# Patient Record
Sex: FEMALE | Race: WHITE
[De-identification: names, ages, dates, MRNs, and addresses within clinical notes are randomized per-mention and may not be internally consistent; named-entity substitution may affect disease eponyms.]

---

## 2017-12-22 NOTE — OR
DATE OF OPERATION:  12/22/2017

 

PREOPERATIVE DIAGNOSIS:  FOLLOW UP POLYPS.

 

POSTOPERATIVE DIAGNOSIS:  FOLLOW UP POLYPS.

 

SURGEON:  Piotr Hutchinson MD

 

PROCEDURE:  FULL-LENGTH COLONOSCOPY.

 

ANESTHESIA:  CRNA.

 

COMPLICATIONS:  None.

 

SPECIMEN:  None.

 

FINDINGS:  Normal full-length colonoscopy.

 

RECOMMENDATIONS:  Followup colonoscopy in 5 years.

 

INDICATIONS:  The patient is in for a followup.  She had four polyps removed two

years ago.  She is due for followup scope.

 

DESCRIPTION OF PROCEDURE:  The patient was prepped and draped, placed in the

left lateral decubitus position.  A lubricated Olympus colonoscope was inserted

and easily advanced to the cecum.  Direct visualization of the ileocecal valve

was accomplished.  The bowel prep was adequate.  Upon withdrawal of the scope

throughout the entire length of the colon, I could find no signs of any polyps,

mass, ulceration, or bleeding sites.  No vascular abnormalities or signs of

colitis.  There were no significant signs of diverticula.  The rectal vault was

unremarkable.  Retroflexion of scope

in the rectum showed no perianal lesions.  Air was suctioned, scope removed

without complication.

 

ANDREA/JONATHAN

DD:  12/22/2017 09:38:08

DT:  12/22/2017 10:04:01

Job #:  [id number]

## 2022-10-07 ENCOUNTER — HOSPITAL ENCOUNTER (OUTPATIENT)
Dept: HOSPITAL 38 - CC.SDS | Age: 62
End: 2022-10-07
Attending: FAMILY MEDICINE
Payer: COMMERCIAL

## 2022-10-07 DIAGNOSIS — I82.409: ICD-10-CM

## 2022-10-07 DIAGNOSIS — Z88.2: ICD-10-CM

## 2022-10-07 DIAGNOSIS — Z98.890: ICD-10-CM

## 2022-10-07 DIAGNOSIS — D12.0: ICD-10-CM

## 2022-10-07 DIAGNOSIS — M81.0: ICD-10-CM

## 2022-10-07 DIAGNOSIS — Z86.010: ICD-10-CM

## 2022-10-07 DIAGNOSIS — D12.6: ICD-10-CM

## 2022-10-07 DIAGNOSIS — D12.3: ICD-10-CM

## 2022-10-07 DIAGNOSIS — Z12.11: Primary | ICD-10-CM

## 2022-10-07 DIAGNOSIS — Z80.0: ICD-10-CM

## 2022-10-07 DIAGNOSIS — Z79.899: ICD-10-CM

## 2022-10-07 DIAGNOSIS — E78.00: ICD-10-CM

## 2022-10-07 PROCEDURE — 45385 COLONOSCOPY W/LESION REMOVAL: CPT
